# Patient Record
Sex: MALE
[De-identification: names, ages, dates, MRNs, and addresses within clinical notes are randomized per-mention and may not be internally consistent; named-entity substitution may affect disease eponyms.]

---

## 2019-07-10 ENCOUNTER — HOSPITAL ENCOUNTER (OUTPATIENT)
Dept: HOSPITAL 88 - CARD | Age: 34
End: 2019-07-10
Attending: FAMILY MEDICINE
Payer: COMMERCIAL

## 2019-07-10 DIAGNOSIS — R41.82: Primary | ICD-10-CM

## 2019-07-10 DIAGNOSIS — I69.854: ICD-10-CM

## 2019-07-10 LAB
BUN SERPL-MCNC: 11 MG/DL (ref 7–26)
BUN/CREAT SERPL: 13 (ref 6–25)
EGFRCR SERPLBLD CKD-EPI 2021: > 60 ML/MIN (ref 60–?)

## 2019-07-10 PROCEDURE — 70470 CT HEAD/BRAIN W/O & W/DYE: CPT

## 2019-07-10 PROCEDURE — 93880 EXTRACRANIAL BILAT STUDY: CPT

## 2019-07-10 PROCEDURE — 84520 ASSAY OF UREA NITROGEN: CPT

## 2019-07-10 PROCEDURE — 82565 ASSAY OF CREATININE: CPT

## 2019-07-10 PROCEDURE — 36415 COLL VENOUS BLD VENIPUNCTURE: CPT

## 2019-07-10 NOTE — DIAGNOSTIC IMAGING REPORT
CT BRAIN WOW



HISTORY: High blood pressure, dizziness, weakness on left side, altered mental

status



COMPARISON:  None.



TECHNIQUE: 

CT of the head was performed before and after the administration of intravenous

contrast. Coronal/sagittal reformations were created.  One or more of the

following dose reduction techniques were used: Automated exposure control,

adjustment of the mA and/or kV according to patient size, and/or utilization of

iterative reconstruction technique. 100 mL of Isovue-370 were administered.



DISCUSSION:



Scalp/Skull: No abnormalities.

Brain sulci: Appropriate for patient's age.

Ventricles: Normal in size and configuration. No hydrocephalus.

Extra-axial spaces: No masses or fluid collections.     



Parenchyma: 

No abnormal densities.

No enhancing abnormalities.

No mass, hemorrhage, or large vascular territory acute infarct.



Dural sinuses:  No abnormal densities.

Sellar/Suprasellar region: Intact.

Skull base: Intact.

Incidental findings:  None.



IMPRESSION:

No intracranial abnormalities.

    



Signed by: Dr. Ghassan Stuart M.D. on 7/10/2019 11:56 AM

## 2019-09-13 ENCOUNTER — HOSPITAL ENCOUNTER (OUTPATIENT)
Dept: HOSPITAL 88 - MRI | Age: 34
End: 2019-09-13
Attending: PSYCHIATRY & NEUROLOGY
Payer: COMMERCIAL

## 2019-09-13 DIAGNOSIS — G44.1: ICD-10-CM

## 2019-09-13 DIAGNOSIS — R29.90: Primary | ICD-10-CM

## 2019-09-13 DIAGNOSIS — I10: ICD-10-CM

## 2019-09-13 LAB
BUN SERPL-MCNC: 10 MG/DL (ref 7–26)
BUN/CREAT SERPL: 10 (ref 6–25)
EGFRCR SERPLBLD CKD-EPI 2021: > 60 ML/MIN (ref 60–?)

## 2019-09-13 PROCEDURE — 36415 COLL VENOUS BLD VENIPUNCTURE: CPT

## 2019-09-13 PROCEDURE — 84520 ASSAY OF UREA NITROGEN: CPT

## 2019-09-13 PROCEDURE — 82565 ASSAY OF CREATININE: CPT

## 2019-09-13 PROCEDURE — 70553 MRI BRAIN STEM W/O & W/DYE: CPT

## 2019-09-13 PROCEDURE — 70549 MR ANGIOGRAPH NECK W/O&W/DYE: CPT

## 2019-09-13 NOTE — DIAGNOSTIC IMAGING REPORT
EXAMINATION: Brain MRI/IAC and MR angiogram of the Neck without and with

contrast



CLINICAL HISTORY: Episodes of transient neurologic symptoms, left-sided

headaches, imbalance.

COMPARISON: Head CT on 7/10/19

TECHNIQUE: 

IACs:: Whole brain: axial FLAIR, DWI and postcontrast axial T1 fat sat.  Thin

sections of the IACs: axial T2 high resolution, T1-IR pre-contrast, T1

post-contrast; coronal T1 post-contrast.  

MRA: 2D-TOF images were obtained of the neck.  

Intravenous Contrast: 20 ml of MultiHance. 



BRAIN/IACs MRI FINDINGS:



     IACs: Normal. No mass or abnormal enhancement.

     Labyrinths: Normal. No abnormal enhancement.

     Cerebellopontine angle cisterns: Normal. No mass or abnormal enhancement.

     Brainstem: Normal.

     Temporal bones: Normal.

     Brain parenchyma: Unremarkable.

     Jugular fossa: No discrete mass, normal position

     Carotid canals: Unremarkable. No aberrant carotid arteries.

     Posterior fossa dural sinuses: No abnormalities





MRA OF THE NECK:

If present, stenosis of the carotid bulbs is measured based on NASCET criteria

i.e area of maximum stenosis compared to the cervical ICA distal to the bulb.



   Aortic arch and origin of the vessels: Unremarkable.



   Right Carotid Artery:

The common carotid, carotid bulb, internal and external carotid arteries at the

level of the neck are normal in caliber, and patent, no evidence of stenoses. 



   Left Carotid Artery:

The common carotid, carotid bulb, internal and external carotid arteries at the

level of the neck are normal in caliber, and patent, no evidence of stenoses.



   Vertebral Arteries:

Both are normal in morphology and caliber. Both are codominant. No significant

stenosis is seen.





IMPRESSION:



1.  Normal IACs and labyrinth MRI.



2.  Normal MR angiogram of the neck. 









Signed by: Dr. Aidee Ingram M.D. on 9/13/2019 9:55 AM